# Patient Record
Sex: FEMALE | Race: BLACK OR AFRICAN AMERICAN | NOT HISPANIC OR LATINO | Employment: FULL TIME | URBAN - METROPOLITAN AREA
[De-identification: names, ages, dates, MRNs, and addresses within clinical notes are randomized per-mention and may not be internally consistent; named-entity substitution may affect disease eponyms.]

---

## 2019-09-24 ENCOUNTER — OFFICE VISIT (OUTPATIENT)
Dept: INTERNAL MEDICINE | Facility: CLINIC | Age: 39
End: 2019-09-24

## 2019-09-24 DIAGNOSIS — R10.9 ABDOMINAL CRAMPING: ICD-10-CM

## 2019-09-24 DIAGNOSIS — Z00.00 HEALTHCARE MAINTENANCE: Primary | ICD-10-CM

## 2019-09-24 DIAGNOSIS — R10.13 EPIGASTRIC PAIN: ICD-10-CM

## 2019-09-24 DIAGNOSIS — R14.0 ABDOMINAL BLOATING: ICD-10-CM

## 2019-09-24 PROCEDURE — 99386 PREV VISIT NEW AGE 40-64: CPT | Performed by: PHYSICIAN ASSISTANT

## 2019-09-24 RX ORDER — OMEPRAZOLE 40 MG/1
40 CAPSULE, DELAYED RELEASE ORAL DAILY
Qty: 90 CAPSULE | Refills: 1 | Status: SHIPPED | OUTPATIENT
Start: 2019-09-24

## 2019-09-24 NOTE — PROGRESS NOTES
Subjective   Chief Complaint   Patient presents with   • Establish Care   • Annual Exam       History of Present Illness     Pt is a 39 year old AA female who presents today to establish care and for CPE. Pap and pelvic are up to date she is seen at [facility]. Has Mirena IUD. Had a mammogram in 2013 was normal. Maternal grandmother had breast cancer. No other family hx. No surgeries, has 1 daughter. No tobacco use. Occasional etoh use, has cut back significantly over the last few years. She reports she is the healthiest she has been in years. She eats clean majority of the time follows an 80/20 diet plan and exercises 7 days a week. No CP, KNAPP, soa.     For the last few years she has had abdominal bloating and increased flatulence with eating. She notes about 4 yrs ago she had an episode of colitis while living in [place]. She did have EGD and cscope at the time. She is unsure where she had these procedures done. .     She states everything she eats causes bloating and abdominal discomfort as well as increase belching and flatulence. No true burning sensation in her esophagus. No cough. Symptoms start about 30 minutes after eating. No diarrhea or constipation. She has a bowel movement daily, has not changed. No frequent nsaid use. About 3 weeks ago she had an episode of epigastric pain that lasted for several hours, mother advised she take nexium. She continued nexium for 2 weeks, feels a little better but still has symptoms. Getting to point she does not want to eat, because seh is so miserable afterwards. She states she has noticed she does not tolerate milk based products well has quite a bit of lower abdominal cramping and bloating after eating. She does consume large amount of gluten and wheat. No blood in her stools.       She is an  in [employer], is up to date on flu shot, hep a and tdap.     Hx of anxiety, took effexor in past does not have any symptoms currently.      There is no  problem list on file for this patient.      No Known Allergies    No current outpatient medications on file prior to visit.     No current facility-administered medications on file prior to visit.        Past Medical History:   Diagnosis Date   • Anxiety        Family History   Problem Relation Age of Onset   • Diabetes Mother    • Hypertension Mother    • No Known Problems Father    • Hypertension Maternal Aunt    • Hypertension Maternal Uncle    • Hypertension Paternal Aunt    • Hypertension Paternal Uncle    • Hypertension Maternal Grandmother    • Breast cancer Maternal Grandmother    • Cancer Maternal Grandmother         gastric   • Hypertension Maternal Grandfather    • Multiple myeloma Maternal Grandfather    • Hypertension Paternal Grandmother        Social History     Socioeconomic History   • Marital status:      Spouse name: Not on file   • Number of children: Not on file   • Years of education: Not on file   • Highest education level: Not on file   Tobacco Use   • Smoking status: Never Smoker   • Smokeless tobacco: Never Used   Substance and Sexual Activity   • Alcohol use: Yes     Frequency: Never     Comment: socially   • Drug use: No       History reviewed. No pertinent surgical history.      The following portions of the patient's history were reviewed and updated as appropriate: problem list, allergies, current medications, past medical history, past family history, past social history and past surgical history.    Review of Systems   Constitution: Negative for malaise/fatigue.   Cardiovascular: Negative for chest pain and dyspnea on exertion.   Respiratory: Negative for shortness of breath.    Musculoskeletal: Negative for arthritis.   Gastrointestinal: Positive for bloating, abdominal pain and nausea. Negative for melena and vomiting.   Neurological: Negative for dizziness and headaches.   Psychiatric/Behavioral: Negative for depression. The patient does not have insomnia and is not  nervous/anxious.        Immunization History   Administered Date(s) Administered   • Flu Vaccine Intradermal Quad 18-64YR 09/03/2019       Objective   There were no vitals filed for this visit.  Physical Exam   Constitutional: She appears well-developed and well-nourished.   HENT:   Head: Normocephalic and atraumatic.   Mouth/Throat: Oropharynx is clear and moist.   Eyes: Conjunctivae and EOM are normal. Pupils are equal, round, and reactive to light.   Neck: Neck supple. No thyromegaly present.   Cardiovascular: Normal rate, regular rhythm and normal heart sounds.   No murmur heard.  Pulmonary/Chest: Effort normal and breath sounds normal.   Abdominal: Soft. She exhibits no distension and no mass. There is no hepatosplenomegaly. There is tenderness in the right upper quadrant and epigastric area. There is no rebound.   Lymphadenopathy:     She has no cervical adenopathy.   Neurological: She is alert.   Skin: Skin is warm.   Psychiatric: She has a normal mood and affect.   Vitals reviewed.        Assessment/Plan   Shelli was seen today for establish care and annual exam.    Diagnoses and all orders for this visit:    Healthcare maintenance  Comments:  Vaccines utd. pap/pelvic breast exam utd.     Abdominal bloating  -     CBC & Differential  -     Comprehensive Metabolic Panel  -     H. Pylori Breath Test - Breath, Lung  -     Celiac Disease Panel  -     US Gallbladder; Future    Epigastric pain  -     CBC & Differential  -     Comprehensive Metabolic Panel  -     H. Pylori Breath Test - Breath, Lung  -     Celiac Disease Panel  -     omeprazole (priLOSEC) 40 MG capsule; Take 1 capsule by mouth Daily.  -     US Gallbladder; Future    Abdominal cramping  -     CBC & Differential  -     Comprehensive Metabolic Panel  -     H. Pylori Breath Test - Breath, Lung  -     Celiac Disease Panel        I am going to start her on Omeprazole 40 mg daily as well as get urea breath test, celiac panel , cbc and cmp. Will also get US  of her gallbladder. She is going to keep a food diarrhea and fup in 6 weeks and we will re evaluate her symptoms. If her work up is negative and she is still having large amounts of bloating I will refer her to GI for scopes. She is unsure which hospital or which GI doctor did her previous EGD/colonoscopy.     Pt did have lipids last year and they are excellent, will repeat in 2020. Pap/pelvic utd. Using mirena for birth control. Continue healthy habits. We did discuss trigger foods for reflux symptoms today and she is going to pay attention to her sx.     Return in about 6 weeks (around 11/5/2019).

## 2019-09-25 LAB
ALBUMIN SERPL-MCNC: 4.3 G/DL (ref 3.5–5.2)
ALBUMIN/GLOB SERPL: 1.8 G/DL
ALP SERPL-CCNC: 70 U/L (ref 39–117)
ALT SERPL-CCNC: 15 U/L (ref 1–33)
AST SERPL-CCNC: 17 U/L (ref 1–32)
BASOPHILS # BLD AUTO: 0.02 10*3/MM3 (ref 0–0.2)
BASOPHILS NFR BLD AUTO: 0.5 % (ref 0–1.5)
BILIRUB SERPL-MCNC: 0.4 MG/DL (ref 0.2–1.2)
BUN SERPL-MCNC: 16 MG/DL (ref 6–20)
BUN/CREAT SERPL: 16.8 (ref 7–25)
CALCIUM SERPL-MCNC: 9 MG/DL (ref 8.6–10.5)
CHLORIDE SERPL-SCNC: 102 MMOL/L (ref 98–107)
CO2 SERPL-SCNC: 25.1 MMOL/L (ref 22–29)
CREAT SERPL-MCNC: 0.95 MG/DL (ref 0.57–1)
ENDOMYSIUM IGA SER QL: NEGATIVE
EOSINOPHIL # BLD AUTO: 0.06 10*3/MM3 (ref 0–0.4)
EOSINOPHIL NFR BLD AUTO: 1.6 % (ref 0.3–6.2)
ERYTHROCYTE [DISTWIDTH] IN BLOOD BY AUTOMATED COUNT: 12.7 % (ref 12.3–15.4)
GLOBULIN SER CALC-MCNC: 2.4 GM/DL
GLUCOSE SERPL-MCNC: 91 MG/DL (ref 65–99)
HCT VFR BLD AUTO: 42 % (ref 34–46.6)
HGB BLD-MCNC: 13.4 G/DL (ref 12–15.9)
IGA SERPL-MCNC: 182 MG/DL (ref 87–352)
IMM GRANULOCYTES # BLD AUTO: 0.01 10*3/MM3 (ref 0–0.05)
IMM GRANULOCYTES NFR BLD AUTO: 0.3 % (ref 0–0.5)
LYMPHOCYTES # BLD AUTO: 1.69 10*3/MM3 (ref 0.7–3.1)
LYMPHOCYTES NFR BLD AUTO: 44.9 % (ref 19.6–45.3)
MCH RBC QN AUTO: 27.1 PG (ref 26.6–33)
MCHC RBC AUTO-ENTMCNC: 31.9 G/DL (ref 31.5–35.7)
MCV RBC AUTO: 85 FL (ref 79–97)
MONOCYTES # BLD AUTO: 0.37 10*3/MM3 (ref 0.1–0.9)
MONOCYTES NFR BLD AUTO: 9.8 % (ref 5–12)
NEUTROPHILS # BLD AUTO: 1.61 10*3/MM3 (ref 1.7–7)
NEUTROPHILS NFR BLD AUTO: 42.9 % (ref 42.7–76)
NRBC BLD AUTO-RTO: 0 /100 WBC (ref 0–0.2)
PLATELET # BLD AUTO: 270 10*3/MM3 (ref 140–450)
POTASSIUM SERPL-SCNC: 4.7 MMOL/L (ref 3.5–5.2)
PROT SERPL-MCNC: 6.7 G/DL (ref 6–8.5)
RBC # BLD AUTO: 4.94 10*6/MM3 (ref 3.77–5.28)
SODIUM SERPL-SCNC: 141 MMOL/L (ref 136–145)
TTG IGA SER-ACNC: <2 U/ML (ref 0–3)
UREA BREATH TEST QL: NEGATIVE
WBC # BLD AUTO: 3.76 10*3/MM3 (ref 3.4–10.8)

## 2019-09-26 ENCOUNTER — TELEPHONE (OUTPATIENT)
Dept: INTERNAL MEDICINE | Facility: CLINIC | Age: 39
End: 2019-09-26

## 2019-09-26 NOTE — TELEPHONE ENCOUNTER
----- Message from Nyla Arellano PA-C sent at 9/26/2019  9:20 AM EDT -----  Her blood work is nml. Celiac panel is negative. h pylor is negative. Lets proceed with gallbladder us.

## 2019-09-26 NOTE — TELEPHONE ENCOUNTER
Left message for patient to call back if any questions, looked in chart and left lab results on answer machine.

## 2019-10-08 ENCOUNTER — TELEPHONE (OUTPATIENT)
Dept: INTERNAL MEDICINE | Facility: CLINIC | Age: 39
End: 2019-10-08

## 2019-10-08 ENCOUNTER — HOSPITAL ENCOUNTER (OUTPATIENT)
Dept: ULTRASOUND IMAGING | Facility: HOSPITAL | Age: 39
Discharge: HOME | End: 2019-10-08
Admitting: PHYSICIAN ASSISTANT

## 2019-10-08 DIAGNOSIS — R10.13 EPIGASTRIC PAIN: ICD-10-CM

## 2019-10-08 DIAGNOSIS — R14.0 ABDOMINAL BLOATING: ICD-10-CM

## 2019-10-08 PROCEDURE — 76705 ECHO EXAM OF ABDOMEN: CPT

## 2019-10-08 NOTE — TELEPHONE ENCOUNTER
----- Message from Nyla Arellano PA-C sent at 10/8/2019  9:26 AM EDT -----  Please let her know that her gallbladder US is normal. Is she still experiencing as frequent of sx?

## 2019-11-05 ENCOUNTER — OFFICE VISIT (OUTPATIENT)
Dept: INTERNAL MEDICINE | Facility: CLINIC | Age: 39
End: 2019-11-05

## 2019-11-05 VITALS
SYSTOLIC BLOOD PRESSURE: 100 MMHG | BODY MASS INDEX: 29.86 KG/M2 | HEIGHT: 68 IN | WEIGHT: 197 LBS | DIASTOLIC BLOOD PRESSURE: 60 MMHG

## 2019-11-05 DIAGNOSIS — K21.9 GASTROESOPHAGEAL REFLUX DISEASE, ESOPHAGITIS PRESENCE NOT SPECIFIED: Primary | ICD-10-CM

## 2019-11-05 DIAGNOSIS — J06.9 UPPER RESPIRATORY TRACT INFECTION, UNSPECIFIED TYPE: ICD-10-CM

## 2019-11-05 PROCEDURE — 99213 OFFICE O/P EST LOW 20 MIN: CPT | Performed by: PHYSICIAN ASSISTANT

## 2019-11-05 NOTE — PROGRESS NOTES
Subjective   Chief Complaint   Patient presents with   • Heartburn       History of Present Illness    Pt is here to fup on her epigastric pain and reflux. She had a normal US of her gallbladder, no stones present. Celiac panel, CMP, H pylori and CBC were all normal. She was started on Omeprazole 40 mg daily and symptoms are 90% improved, pain is mostly resolved. She has noticed that certain foods will trigger bloating, gas, and epigastric pain: popcorn, nut butters and tomato based foods. Overall she feels much better.     Has also had head congestion and cough for the last 3-4 days. No fever or chills. Is not taking any otc meds.     Patient Active Problem List   Diagnosis   • GERD (gastroesophageal reflux disease)       No Known Allergies    Current Outpatient Medications on File Prior to Visit   Medication Sig Dispense Refill   • omeprazole (priLOSEC) 40 MG capsule Take 1 capsule by mouth Daily. 90 capsule 1   • levonorgestrel (MIRENA) 20 MCG/24HR IUD 1 each by Intrauterine route.       No current facility-administered medications on file prior to visit.        Past Medical History:   Diagnosis Date   • Anxiety        Family History   Problem Relation Age of Onset   • Diabetes Mother    • Hypertension Mother    • No Known Problems Father    • Hypertension Maternal Aunt    • Hypertension Maternal Uncle    • Hypertension Paternal Aunt    • Hypertension Paternal Uncle    • Hypertension Maternal Grandmother    • Breast cancer Maternal Grandmother    • Cancer Maternal Grandmother         gastric   • Hypertension Maternal Grandfather    • Multiple myeloma Maternal Grandfather    • Hypertension Paternal Grandmother        Social History     Socioeconomic History   • Marital status:      Spouse name: Not on file   • Number of children: Not on file   • Years of education: Not on file   • Highest education level: Not on file   Tobacco Use   • Smoking status: Never Smoker   • Smokeless tobacco: Never Used  "  Substance and Sexual Activity   • Alcohol use: Yes     Frequency: Never     Comment: socially   • Drug use: No       History reviewed. No pertinent surgical history.      The following portions of the patient's history were reviewed and updated as appropriate: problem list, allergies, current medications, past medical history, past family history, past social history and past surgical history.    Review of Systems   Constitution: Negative for chills and fever.   HENT: Positive for congestion.    Respiratory: Positive for cough.    Gastrointestinal: Positive for bloating and heartburn. Negative for abdominal pain, change in bowel habit, nausea and vomiting.       Immunization History   Administered Date(s) Administered   • Flu Vaccine Intradermal Quad 18-64YR 09/03/2019       Objective   Vitals:    11/05/19 0912 11/05/19 1014   BP:  100/60   Weight: 89.4 kg (197 lb)    Height: 172.7 cm (68\")      Physical Exam   Constitutional: She appears well-developed and well-nourished.   Cardiovascular: Normal rate, regular rhythm and normal heart sounds.   No murmur heard.  Pulmonary/Chest: Effort normal and breath sounds normal. She has no wheezes. She has no rales.   Abdominal: Soft. Bowel sounds are normal. She exhibits no distension. There is no tenderness. There is no guarding.   Vitals reviewed.        Assessment/Plan   Shelli was seen today for heartburn.    Diagnoses and all orders for this visit:    Gastroesophageal reflux disease, esophagitis presence not specified    Upper respiratory tract infection, unspecified type    GERD: Workup including US of gallbladder, CMP, Celiac panel, CBC and h pylori was negative. She has had near resolution of all symptoms since starting omeprazole. We will keep her on this for at least 6 month and reassess her symptoms at that time. If they worsen or change prior to her FUP she will call and will have her see GI for EGD. As she clinically is markedly improved will not refer at this " time.     URI: Start mucinex, antihistamine and use Afrin x 3 days. Call if not improving early next week will send in abx.     Return in about 6 months (around 5/5/2020), or GERD.

## 2020-06-17 ENCOUNTER — APPOINTMENT (OUTPATIENT)
Dept: WOMENS IMAGING | Facility: HOSPITAL | Age: 40
End: 2020-06-17

## 2020-06-17 PROCEDURE — 77067 SCR MAMMO BI INCL CAD: CPT | Performed by: RADIOLOGY

## 2020-06-17 PROCEDURE — 77063 BREAST TOMOSYNTHESIS BI: CPT | Performed by: RADIOLOGY

## 2021-06-21 ENCOUNTER — APPOINTMENT (OUTPATIENT)
Dept: WOMENS IMAGING | Facility: HOSPITAL | Age: 41
End: 2021-06-21

## 2021-06-21 PROCEDURE — 77067 SCR MAMMO BI INCL CAD: CPT | Performed by: RADIOLOGY

## 2021-06-21 PROCEDURE — 77063 BREAST TOMOSYNTHESIS BI: CPT | Performed by: RADIOLOGY

## 2022-06-30 ENCOUNTER — APPOINTMENT (OUTPATIENT)
Dept: WOMENS IMAGING | Facility: HOSPITAL | Age: 42
End: 2022-06-30

## 2022-06-30 PROCEDURE — 77063 BREAST TOMOSYNTHESIS BI: CPT | Performed by: RADIOLOGY

## 2022-06-30 PROCEDURE — 77067 SCR MAMMO BI INCL CAD: CPT | Performed by: RADIOLOGY

## 2025-01-28 ENCOUNTER — APPOINTMENT (OUTPATIENT)
Dept: WOMENS IMAGING | Facility: HOSPITAL | Age: 45
End: 2025-01-28
Payer: COMMERCIAL

## 2025-01-28 PROCEDURE — G0279 TOMOSYNTHESIS, MAMMO: HCPCS | Performed by: RADIOLOGY

## 2025-01-28 PROCEDURE — 77065 DX MAMMO INCL CAD UNI: CPT | Performed by: RADIOLOGY

## 2025-01-28 PROCEDURE — 77061 BREAST TOMOSYNTHESIS UNI: CPT | Performed by: RADIOLOGY

## 2025-01-28 PROCEDURE — 76642 ULTRASOUND BREAST LIMITED: CPT | Performed by: RADIOLOGY

## 2025-02-13 ENCOUNTER — LAB REQUISITION (OUTPATIENT)
Dept: LAB | Facility: HOSPITAL | Age: 45
End: 2025-02-13
Payer: COMMERCIAL

## 2025-02-13 ENCOUNTER — APPOINTMENT (OUTPATIENT)
Dept: WOMENS IMAGING | Facility: HOSPITAL | Age: 45
End: 2025-02-13
Payer: COMMERCIAL

## 2025-02-13 ENCOUNTER — HOSPITAL ENCOUNTER (OUTPATIENT)
Dept: MAMMOGRAPHY | Facility: HOSPITAL | Age: 45
Discharge: HOME | End: 2025-02-13
Payer: COMMERCIAL

## 2025-02-13 DIAGNOSIS — N64.89 BREAST ASYMMETRY: ICD-10-CM

## 2025-02-13 DIAGNOSIS — N64.89 OTHER SPECIFIED DISORDERS OF BREAST: ICD-10-CM

## 2025-02-13 PROCEDURE — A4648 IMPLANTABLE TISSUE MARKER: HCPCS | Performed by: RADIOLOGY

## 2025-02-13 PROCEDURE — C1819 TISSUE LOCALIZATION-EXCISION: HCPCS | Performed by: RADIOLOGY

## 2025-02-13 PROCEDURE — 88305 TISSUE EXAM BY PATHOLOGIST: CPT | Performed by: NURSE PRACTITIONER

## 2025-02-13 PROCEDURE — 76098 X-RAY EXAM SURGICAL SPECIMEN: CPT

## 2025-02-13 PROCEDURE — 19081 BX BREAST 1ST LESION STRTCTC: CPT | Performed by: RADIOLOGY

## 2025-02-14 LAB
CYTO UR: NORMAL
DX PRELIMINARY: NORMAL
LAB AP CASE REPORT: NORMAL
PATH REPORT.FINAL DX SPEC: NORMAL
PATH REPORT.GROSS SPEC: NORMAL